# Patient Record
Sex: MALE | Race: WHITE | NOT HISPANIC OR LATINO | URBAN - METROPOLITAN AREA
[De-identification: names, ages, dates, MRNs, and addresses within clinical notes are randomized per-mention and may not be internally consistent; named-entity substitution may affect disease eponyms.]

---

## 2021-08-25 ENCOUNTER — HOSPITAL ENCOUNTER (OUTPATIENT)
Dept: RADIOLOGY | Facility: HOSPITAL | Age: 61
Discharge: HOME | End: 2021-08-25
Attending: PODIATRIST

## 2021-08-25 ENCOUNTER — OFFICE VISIT (OUTPATIENT)
Dept: PODIATRY | Facility: CLINIC | Age: 61
End: 2021-08-25
Payer: OTHER GOVERNMENT

## 2021-08-25 DIAGNOSIS — E11.621 DIABETIC ULCER OF TOE OF LEFT FOOT ASSOCIATED WITH TYPE 2 DIABETES MELLITUS, WITH NECROSIS OF BONE (HCC): ICD-10-CM

## 2021-08-25 DIAGNOSIS — L03.032 CELLULITIS OF TOE OF LEFT FOOT: ICD-10-CM

## 2021-08-25 DIAGNOSIS — M86.9 OSTEOMYELITIS OF SECOND TOE OF LEFT FOOT (HCC): Primary | ICD-10-CM

## 2021-08-25 DIAGNOSIS — M86.9 OSTEOMYELITIS OF SECOND TOE OF LEFT FOOT (HCC): ICD-10-CM

## 2021-08-25 DIAGNOSIS — L97.524 DIABETIC ULCER OF TOE OF LEFT FOOT ASSOCIATED WITH TYPE 2 DIABETES MELLITUS, WITH NECROSIS OF BONE (HCC): ICD-10-CM

## 2021-08-25 PROCEDURE — 99204 OFFICE O/P NEW MOD 45 MIN: CPT | Performed by: PODIATRIST

## 2021-08-25 PROCEDURE — 73630 X-RAY EXAM OF FOOT: CPT

## 2021-08-25 RX ORDER — SULFAMETHOXAZOLE AND TRIMETHOPRIM 800; 160 MG/1; MG/1
1 TABLET ORAL EVERY 12 HOURS SCHEDULED
Qty: 14 TABLET | Refills: 0 | Status: SHIPPED | OUTPATIENT
Start: 2021-08-25 | End: 2021-09-01

## 2021-08-25 RX ORDER — ALOGLIPTIN 25 MG/1
TABLET, FILM COATED ORAL
COMMUNITY
Start: 2021-07-07

## 2021-08-25 NOTE — PROGRESS NOTES
Assessment/Plan:         Diagnoses and all orders for this visit:    Osteomyelitis of second toe of left foot (HCC)  -     sulfamethoxazole-trimethoprim (BACTRIM DS) 800-160 mg per tablet; Take 1 tablet by mouth every 12 (twelve) hours for 7 days  -     X-ray foot left 3+ views; Future    Cellulitis of toe of left foot  -     sulfamethoxazole-trimethoprim (BACTRIM DS) 800-160 mg per tablet; Take 1 tablet by mouth every 12 (twelve) hours for 7 days  -     X-ray foot left 3+ views; Future    Diabetic ulcer of toe of left foot associated with type 2 diabetes mellitus, with necrosis of bone (HCC)  -     sulfamethoxazole-trimethoprim (BACTRIM DS) 800-160 mg per tablet; Take 1 tablet by mouth every 12 (twelve) hours for 7 days  -     X-ray foot left 3+ views; Future    Other orders  -     insulin glargine (LANTUS SOLOSTAR) 100 units/mL injection pen; INJECT 30 UNITS SUBCUTANEOUSLY EVERY DAY FOR DIABETES DOSE AS PER SUGAR READING  -     insulin aspart, w/niacinamide, (FIASP) 100 Units/mL injection pen; INJECT 10 UNITS SUBCUTANEOUSLY THREE TIMES A DAY WITH MEALS FOR DIABETES ADVISED TO CHECK SUGAR LEVEL BEFORE TAKE INSULIN AND USE WITH MEAL IF  SUGAR GREATER THAN 150  -     Alogliptin Benzoate 25 MG TABS; TAKE ONE TABLET BY MOUTH DAILY FOR DIABETES  -     Empagliflozin 25 MG TABS; TAKE ONE TABLET BY MOUTH DAILY  -     metFORMIN (GLUCOPHAGE) 1000 MG tablet; Take 1,000 mg by mouth 2 (two) times a day with meals      diagnosis and options discussed  I did inform the patient is x-ray shows subtle erosion of the tip of his distal phalanx of his 2nd toe  Given the wound is directly in this area and there is exposed bone this is clinical and radiographic evidence of osteomyelitis  Treatment would involve partial amputation of the digit  Patient is agreeable  He is also agreeable to having this performed under local anesthesia next week  I have prescribed an oral antibiotic    If the toe appearance were to get worse or redness spreading to his foot he should go to hospital immediately  Wound care instructions given to patient  He should be off his foot as much as possible  Patient is high risk for complications because of previous amputations and need for upcoming amputation from his diabetic neuropathy  Subjective:      Patient ID: Fidencio Terrazas is a 56 y o  male  Patient presents with a sore on his left toe  It began in May  He had a toe on his right foot amputated last winter  He is diabetic, last A1C was "8 something " He drinks socially every day  He is on new diabetic medication  The wound has been present since May  He has been putting medi-honey on the toe and covering with a bandaid  HE has not seen a doctor for this toe even though it has been present for 3-4 months  He has neuropathy  PMH: poorly controlled DM2      The following portions of the patient's history were reviewed and updated as appropriate: He  has a past medical history of Diabetes mellitus ([facility] [address] )  He There are no problems to display for this patient  He  has a past surgical history that includes Toe amputation (05/2021)  His family history is not on file  He  reports that he has never smoked  He has never used smokeless tobacco  He reports current alcohol use  No history on file for drug use    Current Outpatient Medications   Medication Sig Dispense Refill    Alogliptin Benzoate 25 MG TABS TAKE ONE TABLET BY MOUTH DAILY FOR DIABETES      Empagliflozin 25 MG TABS TAKE ONE TABLET BY MOUTH DAILY      insulin aspart, w/niacinamide, (FIASP) 100 Units/mL injection pen INJECT 10 UNITS SUBCUTANEOUSLY THREE TIMES A DAY WITH MEALS FOR DIABETES ADVISED TO CHECK SUGAR LEVEL BEFORE TAKE INSULIN AND USE WITH MEAL IF  SUGAR GREATER THAN 150      insulin glargine (LANTUS SOLOSTAR) 100 units/mL injection pen INJECT 30 UNITS SUBCUTANEOUSLY EVERY DAY FOR DIABETES DOSE AS PER SUGAR READING      metFORMIN (GLUCOPHAGE) 1000 MG tablet Take 1,000 mg by mouth 2 (two) times a day with meals      sulfamethoxazole-trimethoprim (BACTRIM DS) 800-160 mg per tablet Take 1 tablet by mouth every 12 (twelve) hours for 7 days 14 tablet 0     No current facility-administered medications for this visit  Current Outpatient Medications on File Prior to Visit   Medication Sig    Alogliptin Benzoate 25 MG TABS TAKE ONE TABLET BY MOUTH DAILY FOR DIABETES    Empagliflozin 25 MG TABS TAKE ONE TABLET BY MOUTH DAILY    insulin aspart, w/niacinamide, (FIASP) 100 Units/mL injection pen INJECT 10 UNITS SUBCUTANEOUSLY THREE TIMES A DAY WITH MEALS FOR DIABETES ADVISED TO CHECK SUGAR LEVEL BEFORE TAKE INSULIN AND USE WITH MEAL IF  SUGAR GREATER THAN 150    insulin glargine (LANTUS SOLOSTAR) 100 units/mL injection pen INJECT 30 UNITS SUBCUTANEOUSLY EVERY DAY FOR DIABETES DOSE AS PER SUGAR READING    metFORMIN (GLUCOPHAGE) 1000 MG tablet Take 1,000 mg by mouth 2 (two) times a day with meals     No current facility-administered medications on file prior to visit  He has No Known Allergies       Review of Systems   Constitutional: Negative  HENT: Negative  Respiratory: Negative  Cardiovascular: Negative  Gastrointestinal: Negative  Musculoskeletal: Negative  Previous right 2nd toe amputation   Skin: Positive for color change and wound  Neurological: Positive for numbness  Negative for weakness  Psychiatric/Behavioral: The patient is not nervous/anxious  Objective: There were no vitals taken for this visit  Patient declined       Physical Exam  Vitals reviewed  Constitutional:       Appearance: He is obese  He is not ill-appearing or diaphoretic  HENT:      Nose: No congestion or rhinorrhea  Cardiovascular:      Rate and Rhythm: Normal rate  Pulses: Normal pulses  no weak pulses          Dorsalis pedis pulses are 2+ on the right side and 2+ on the left side  Posterior tibial pulses are 2+ on the right side and 2+ on the left side  Pulmonary:      Effort: Pulmonary effort is normal  No respiratory distress  Abdominal:      General: There is no distension  Tenderness: There is no abdominal tenderness  Feet:      Right foot:      Skin integrity: No ulcer, skin breakdown, erythema, warmth, callus or dry skin  Left foot:      Skin integrity: Ulcer (There is a 0 4 x 0 4 x 0 5 cm ulcer to the tip of the left 2nd digit which probes directly to bone  No soniya pus but there is necrotic tissue  The toe is edematous and erythematous) and erythema present  Skin:     Capillary Refill: Capillary refill takes less than 2 seconds  Findings: Erythema present  Neurological:      Mental Status: He is alert and oriented to person, place, and time  Sensory: Sensory deficit present  Motor: No weakness  Gait: Gait normal    Psychiatric:         Mood and Affect: Mood normal          Diabetic Foot Exam    Right Foot/Ankle   Right Foot Inspection  Skin Exam: skin normal and skin intact no dry skin, no warmth, no callus, no erythema, no maceration, no abnormal color, no pre-ulcer, no ulcer and no callus                          Toe Exam: right toe deformity ( partial amputation 2nd digit)  Sensory   Vibration: diminished  Proprioception: diminished   Monofilament testing: diminished  Vascular  Capillary refills: < 3 seconds  The right DP pulse is 2+  The right PT pulse is 2+  Right Toe  - Comprehensive Exam  Tenderness: none         Left Foot/Ankle  Left Foot Inspection  Skin Exam: erythema and ulcer (There is a 0 4 x 0 4 x 0 5 cm ulcer to the tip of the left 2nd digit which probes directly to bone  No soniya pus but there is necrotic tissue    The toe is edematous and erythematous)                         Toe Exam: swelling and left toe deformity ( clawtoe deformity)                   Sensory   Vibration: diminished  Proprioception: diminished  Monofilament: diminished  Vascular  Capillary refills: < 3 seconds  The left DP pulse is 2+  The left PT pulse is 2+  Left Toe  - Comprehensive Exam  Hammertoes: second toe  Swelling: lesser metatarsophalangeal joints   Tenderness: none       Assign Risk Category:  Deformity present; Loss of protective sensation; No weak pulses       Risk: 3    Left 2nd HT with ulcer probes directly to bone  Toe is red and swollen   Pulses good  hammerote contracture with prominent met heads      XRay 3 views of the left foot personally read by Dr Alex Peña in office today and discussed with patient:  1  Subtle lucency noted at the very tip of the distal phalanx of the left 2nd digit  This is highly suggestive of osteomyelitis in the setting of the chronic ulcer to this area  No soft tissue emphysema